# Patient Record
(demographics unavailable — no encounter records)

---

## 2020-01-01 NOTE — P.HPPD
History of Present Illness


Maternal history


Baby boy born to Marc Baltazar , she is 31 year old G3 now 


Blood Type O+, Antibody Screen- Negative, 


Syphilis- Nonreactive, Hepatitis B- Negative, HIV- Negative, Rubella- Immune


Gonorrhea-Negative,Chlamydia- Negative


GBS negative


Pregnancy complication: 


-Bell's palsy treated with Valtrex and prednisone





Maternal history of postpartum depression





 delivery summary


Gestational age 39 0/7 weeks via vaginal delivery following induction of labor 

with artificial ROM 6 hours prior to delivery, clear fluids


YOB: 2020


Birth Time: 14:00


Birth Weight:  3510 g -  appropriate for gestational age


Birth Length: 20 in


Head Circumference: 13.5 in


Apgar at 1 and 5 minutes: 9/9


3 Cord Vessels 


 


Delivery complications: none - no resuscitation needed














Medications and Allergies


                                    Allergies











Allergy/AdvReac Type Severity Reaction Status Date / Time


 


No Known Allergies Allergy   Verified 20 14:13














Exam


                                   Vital Signs











  Temp Pulse Pulse Resp


 


 20 14:12  98.5 F  176 H  160  50








                                Intake and Output











 20





 06:59 14:59 22:59


 


Other:   


 


  Weight  3.51 kg 











General: Alert, strong cry, no gross facial dysmorphism


HEENT: Anterior fontanelle soft and flat. Ears appear normal bilateral. Nose is 

normal.


Mouth: Hard palate fused. Normal mucosa


Neck: Supple. Clavicle intact bilateral


Chest: Symmetrical movements.


Heart: S1 S2 heard, no murmurs. Femoral pulses palpable bilaterally.


Respiratory: Lungs clear to auscultation bilateral, respirations unlabored


Abdomen: Soft, non tender, no organomegaly. Bowel sounds normal. Umbilical cord 

looks intact


Genitals: Normal female genitalia. Anus patent


Musculoskeletal: No scoliosis.  No sacral dimple noted.  Movements symmetrical. 

No polydactyly. Ortolani and Guadarrama negative


Skin: No rash/lesions


Reflexes: Sucking, Giulia's, rooting, and grasp reflex present equal bilaterally. 





Assessment and Plan


(1) Single liveborn, born in hospital, delivered by vaginal delivery


Current Visit: Yes   Status: Acute   Code(s): Z38.00 - SINGLE LIVEBORN INFANT, 

DELIVERED VAGINALLY   SNOMED Code(s): 86162457779201


   


Plan: 


Routine  care

## 2020-01-01 NOTE — P.DS
Providers


Date of admission: 


20 14:00





Attending physician: 


Milady Pedraza MD








- Discharge Diagnosis(es)


(1) Single liveborn, born in hospital, delivered by vaginal delivery


Status: Acute   


Hospital Course: 


Maternal history


Baby boy born to Marc Baltazar , she is 31 year old G3 now 


Blood Type O+, Antibody Screen- Negative, 


Syphilis- Nonreactive, Hepatitis B- Negative, HIV- Negative, Rubella- Immune


Gonorrhea-Negative,Chlamydia- Negative


GBS negative


Pregnancy complication: 


-Bell's palsy treated with Valtrex and prednisone





Maternal history of postpartum depression





 delivery summary


Gestational age 39 0/7 weeks via vaginal delivery following induction of labor 

with artificial ROM 6 hours prior to delivery, clear fluids


YOB: 2020


Birth Time: 14:00


Birth Weight:  3510 g -  appropriate for gestational age


Birth Length: 20 in


Head Circumference: 13.5 in


Apgar at 1 and 5 minutes: 9/9


3 Cord Vessels 


 


Delivery complications: none - no resuscitation needed





Nursery course 


Vital signs were stable during nursery stay. Baby was exclusively breast fed


Transcutaneous bilirubin was 3.7 at 24 hour of life, low risk zone. Other labs 

values included blood type O positive, JUAN negative.  Erythromycin eye ointment,

Hepatitis B vaccination and Vitamin K given. Hearing screen and CCHD passed.  

 screen collected. Baby has voided and stooled prior to discharge.





Discharge exam 


Discharge weight:  3430 g ( weight loss of 2%)


General: Alert, strong cry, no gross facial dysmorphism


HEENT: Anterior fontanelle soft and flat. Ears appear normal bilateral. Nose is 

normal


Eyes: Red reflex present bilaterally. No eye discharge. Sclera white


Mouth: Hard palate fused. Normal mucosa


Neck: Supple. Clavicle intact bilateral


Chest: Symmetrical movements.


Heart: S1 S2 heard, no murmurs. Femoral pulses palpable bilaterally.


Respiratory: Lungs clear to auscultation bilateral, respirations unlabored


Abdomen: Soft, non tender, no organomegaly. Bowel sounds normal. Umbilical cord 

looks intact


Genitals: Normal female genitalia


Musculoskeletal: Movements symmetrical. No polydactyly. Ortolani and Guadarrama 

negative.


Skin: No rash/lesions


Reflexes: Sucking, Volga's, rooting, and grasp reflex present equal bilaterally. 





Routine  counseling was discussed.


Patient Condition at Discharge: Good





Plan - Discharge Summary


Follow up Appointment(s)/Referral(s): 


Quique Doan MD [STAFF PHYSICIAN] - 20


Discharge Disposition: HOME SELF-CARE